# Patient Record
Sex: FEMALE | Race: BLACK OR AFRICAN AMERICAN | NOT HISPANIC OR LATINO | Employment: OTHER | URBAN - METROPOLITAN AREA
[De-identification: names, ages, dates, MRNs, and addresses within clinical notes are randomized per-mention and may not be internally consistent; named-entity substitution may affect disease eponyms.]

---

## 2015-10-29 LAB — BCS RECOMMENDATION EXT: NORMAL

## 2019-11-18 LAB — PAP RECOMMENDATION EXT: ABNORMAL

## 2019-11-21 LAB
HPV 16: NEGATIVE
HPV 18: NEGATIVE
HPV, HR, OTHER GENOTYPES: NEGATIVE

## 2019-12-05 PROBLEM — N02.2 MEMBRANOUS NEPHROPATHY DETERMINED BY BIOPSY: Status: ACTIVE | Noted: 2019-11-23

## 2019-12-05 PROBLEM — M79.89 LEG SWELLING: Status: ACTIVE | Noted: 2019-12-05

## 2019-12-05 PROBLEM — N17.9 AKI (ACUTE KIDNEY INJURY): Status: ACTIVE | Noted: 2019-05-18

## 2019-12-05 PROBLEM — R09.89 BILATERAL RALES: Status: ACTIVE | Noted: 2019-12-05

## 2019-12-05 PROBLEM — R06.02 SOB (SHORTNESS OF BREATH): Status: ACTIVE | Noted: 2019-12-05

## 2019-12-05 PROBLEM — I10 HYPERTENSION: Status: ACTIVE | Noted: 2019-12-05

## 2019-12-05 PROBLEM — N04.9 NEPHROTIC SYNDROME: Status: ACTIVE | Noted: 2019-12-05

## 2019-12-08 PROBLEM — R06.02 SHORTNESS OF BREATH: Status: RESOLVED | Noted: 2019-12-05 | Resolved: 2019-12-08

## 2019-12-08 PROBLEM — R09.89 BILATERAL RALES: Status: RESOLVED | Noted: 2019-12-05 | Resolved: 2019-12-08

## 2019-12-08 PROBLEM — N04.9 RENAL ANASARCA: Status: ACTIVE | Noted: 2019-12-08

## 2019-12-08 PROBLEM — M79.89 LEG SWELLING: Status: RESOLVED | Noted: 2019-12-05 | Resolved: 2019-12-08

## 2019-12-12 PROBLEM — N17.9 AKI (ACUTE KIDNEY INJURY): Status: RESOLVED | Noted: 2019-05-18 | Resolved: 2019-12-12

## 2019-12-18 PROBLEM — I48.91 ATRIAL FIBRILLATION: Status: ACTIVE | Noted: 2019-11-22

## 2019-12-18 PROBLEM — E78.5 HYPERLIPIDEMIA: Status: ACTIVE | Noted: 2019-11-13

## 2019-12-18 PROBLEM — R06.00 DYSPNEA: Status: ACTIVE | Noted: 2019-11-13

## 2019-12-18 PROBLEM — N26.9 GLOMERULOSCLEROSIS: Status: ACTIVE | Noted: 2019-12-18

## 2019-12-18 PROBLEM — N93.9 VAGINAL BLEEDING: Status: ACTIVE | Noted: 2019-12-18

## 2019-12-18 PROBLEM — N95.0 POSTMENOPAUSAL BLEEDING: Status: ACTIVE | Noted: 2019-12-18

## 2019-12-18 PROBLEM — R06.02 SOB (SHORTNESS OF BREATH): Status: ACTIVE | Noted: 2019-11-13

## 2019-12-21 PROBLEM — G62.9 PERIPHERAL POLYNEUROPATHY: Status: ACTIVE | Noted: 2019-12-21

## 2019-12-23 PROBLEM — R06.02 SHORTNESS OF BREATH: Status: RESOLVED | Noted: 2019-11-13 | Resolved: 2019-12-23

## 2019-12-23 PROBLEM — R60.0 PERIPHERAL EDEMA: Status: ACTIVE | Noted: 2019-12-23

## 2019-12-24 PROBLEM — R11.10 VOMITING: Status: ACTIVE | Noted: 2019-12-24

## 2020-01-07 PROBLEM — E87.3 METABOLIC ALKALOSIS: Status: ACTIVE | Noted: 2020-01-07

## 2020-01-07 PROBLEM — N18.4 CKD (CHRONIC KIDNEY DISEASE) STAGE 4, GFR 15-29 ML/MIN: Status: ACTIVE | Noted: 2020-01-07

## 2020-01-07 PROBLEM — R26.2 DIFFICULTY WALKING: Status: ACTIVE | Noted: 2020-01-07

## 2020-01-07 PROBLEM — R68.89 DECREASED FUNCTIONAL ACTIVITY TOLERANCE: Status: ACTIVE | Noted: 2020-01-07

## 2020-01-07 PROBLEM — E66.01 OBESITY, CLASS III, BMI 40-49.9 (MORBID OBESITY): Status: ACTIVE | Noted: 2020-01-07

## 2020-01-07 PROBLEM — N25.81 SECONDARY HYPERPARATHYROIDISM: Status: ACTIVE | Noted: 2020-01-07

## 2020-01-07 PROBLEM — E83.51 HYPOCALCEMIA: Status: ACTIVE | Noted: 2020-01-07

## 2020-01-07 PROBLEM — E55.9 VITAMIN D INSUFFICIENCY: Status: ACTIVE | Noted: 2020-01-07

## 2020-02-27 PROBLEM — R11.10 VOMITING: Status: RESOLVED | Noted: 2019-12-24 | Resolved: 2020-02-27

## 2020-02-27 PROBLEM — R26.2 DIFFICULTY WALKING: Status: RESOLVED | Noted: 2020-01-07 | Resolved: 2020-02-27

## 2020-02-27 PROBLEM — R60.0 PERIPHERAL EDEMA: Status: RESOLVED | Noted: 2019-12-23 | Resolved: 2020-02-27

## 2020-04-09 PROBLEM — E83.39 HYPERPHOSPHATEMIA: Chronic | Status: ACTIVE | Noted: 2020-04-09

## 2020-04-09 PROBLEM — D50.8 IRON DEFICIENCY ANEMIA SECONDARY TO INADEQUATE DIETARY IRON INTAKE: Status: ACTIVE | Noted: 2020-04-09

## 2020-04-09 PROBLEM — E66.01 OBESITY, CLASS III, BMI 40-49.9 (MORBID OBESITY): Status: RESOLVED | Noted: 2020-01-07 | Resolved: 2020-04-09

## 2020-05-19 PROBLEM — Z12.11 SCREENING FOR COLORECTAL CANCER: Status: ACTIVE | Noted: 2020-05-19

## 2020-05-19 PROBLEM — Z12.12 SCREENING FOR COLORECTAL CANCER: Status: ACTIVE | Noted: 2020-05-19

## 2020-05-29 PROBLEM — D50.9 IRON DEFICIENCY ANEMIA: Status: ACTIVE | Noted: 2020-05-29

## 2020-05-29 PROBLEM — R79.89 AZOTEMIA: Status: ACTIVE | Noted: 2020-05-29

## 2020-05-29 LAB — CRC RECOMMENDATION EXT: NORMAL

## 2020-05-30 PROBLEM — E83.51 HYPOCALCEMIA: Status: RESOLVED | Noted: 2020-01-07 | Resolved: 2020-05-30

## 2020-05-30 PROBLEM — M79.604 BILATERAL LEG PAIN: Status: ACTIVE | Noted: 2020-05-30

## 2020-05-30 PROBLEM — M79.605 BILATERAL LEG PAIN: Status: ACTIVE | Noted: 2020-05-30

## 2020-06-02 PROBLEM — N19 UREMIA: Status: ACTIVE | Noted: 2020-06-02

## 2020-06-02 PROBLEM — N18.5 CKD (CHRONIC KIDNEY DISEASE) STAGE 5, GFR LESS THAN 15 ML/MIN: Status: ACTIVE | Noted: 2020-01-07

## 2020-06-17 ENCOUNTER — NURSE TRIAGE (OUTPATIENT)
Dept: ADMINISTRATIVE | Facility: CLINIC | Age: 55
End: 2020-06-17

## 2020-06-17 NOTE — TELEPHONE ENCOUNTER
Caller unable to hear RN on home number. Mobile number busy upon second attempt. Unsuccessful attempts to contact pt at this time. An [facility] ([facility]) RN will reach out again tomorrow.

## 2020-06-18 NOTE — TELEPHONE ENCOUNTER
Pt outreach occurred x 2 unsuccessful attempts on opposing days; all numbers on pts chart attempted. Chart will be closed per [facility] ([facility]) policy.

## 2020-06-26 PROBLEM — E87.3 METABOLIC ALKALOSIS: Status: RESOLVED | Noted: 2020-01-07 | Resolved: 2020-06-26

## 2020-06-26 PROBLEM — M79.605 BILATERAL LEG PAIN: Status: RESOLVED | Noted: 2020-05-30 | Resolved: 2020-06-26

## 2020-06-26 PROBLEM — M79.604 BILATERAL LEG PAIN: Status: RESOLVED | Noted: 2020-05-30 | Resolved: 2020-06-26

## 2020-06-26 PROBLEM — N19 UREMIA: Status: RESOLVED | Noted: 2020-06-02 | Resolved: 2020-06-26

## 2020-06-26 PROBLEM — N17.9 AKI (ACUTE KIDNEY INJURY): Status: RESOLVED | Noted: 2019-05-18 | Resolved: 2020-06-26

## 2020-06-26 PROBLEM — N93.9 VAGINAL BLEEDING: Status: RESOLVED | Noted: 2019-12-18 | Resolved: 2020-06-26

## 2020-10-15 PROBLEM — Z99.2 ESRD (END STAGE RENAL DISEASE) ON DIALYSIS: Status: ACTIVE | Noted: 2020-10-15

## 2020-10-15 PROBLEM — N18.6 ESRD (END STAGE RENAL DISEASE) ON DIALYSIS: Status: ACTIVE | Noted: 2020-10-15

## 2020-12-21 PROBLEM — Z49.01 FITTING AND ADJUSTMENT OF EXTRACORPOREAL DIALYSIS CATHETER: Status: ACTIVE | Noted: 2020-05-19

## 2021-01-05 DIAGNOSIS — U07.1 COVID-19 VIRUS DETECTED: ICD-10-CM

## 2021-08-10 ENCOUNTER — CLINICAL SUPPORT (OUTPATIENT)
Dept: SMOKING CESSATION | Facility: CLINIC | Age: 56
End: 2021-08-10
Payer: COMMERCIAL

## 2021-08-10 DIAGNOSIS — F17.200 NICOTINE DEPENDENCE: Primary | ICD-10-CM

## 2021-08-10 PROCEDURE — 99404 PR PREVENT COUNSEL,INDIV,60 MIN: ICD-10-PCS | Mod: S$GLB,,, | Performed by: GENERAL PRACTICE

## 2021-08-10 PROCEDURE — 99404 PREV MED CNSL INDIV APPRX 60: CPT | Mod: S$GLB,,, | Performed by: GENERAL PRACTICE

## 2021-08-10 RX ORDER — MICONAZOLE NITRATE 2 %
2 CREAM (GRAM) TOPICAL
Qty: 100 EACH | Refills: 0 | Status: ON HOLD | OUTPATIENT
Start: 2021-08-10 | End: 2021-11-24 | Stop reason: CLARIF

## 2021-08-10 RX ORDER — IBUPROFEN 200 MG
1 TABLET ORAL DAILY
Qty: 14 PATCH | Refills: 0 | Status: ON HOLD | OUTPATIENT
Start: 2021-08-10 | End: 2021-11-24 | Stop reason: CLARIF

## 2021-08-25 ENCOUNTER — CLINICAL SUPPORT (OUTPATIENT)
Dept: SMOKING CESSATION | Facility: CLINIC | Age: 56
End: 2021-08-25
Payer: COMMERCIAL

## 2021-08-25 DIAGNOSIS — F17.200 NICOTINE DEPENDENCE: Primary | ICD-10-CM

## 2021-08-25 PROCEDURE — 99402 PR PREVENT COUNSEL,INDIV,30 MIN: ICD-10-PCS | Mod: S$GLB,,, | Performed by: GENERAL PRACTICE

## 2021-08-25 PROCEDURE — 99402 PREV MED CNSL INDIV APPRX 30: CPT | Mod: S$GLB,,, | Performed by: GENERAL PRACTICE

## 2021-09-14 ENCOUNTER — TELEPHONE (OUTPATIENT)
Dept: SMOKING CESSATION | Facility: CLINIC | Age: 56
End: 2021-09-14

## 2021-09-16 ENCOUNTER — TELEPHONE (OUTPATIENT)
Dept: SMOKING CESSATION | Facility: CLINIC | Age: 56
End: 2021-09-16

## 2021-10-05 ENCOUNTER — TELEPHONE (OUTPATIENT)
Dept: SMOKING CESSATION | Facility: CLINIC | Age: 56
End: 2021-10-05

## 2021-10-06 ENCOUNTER — CLINICAL SUPPORT (OUTPATIENT)
Dept: SMOKING CESSATION | Facility: CLINIC | Age: 56
End: 2021-10-06
Payer: COMMERCIAL

## 2021-10-06 DIAGNOSIS — F17.200 NICOTINE DEPENDENCE: Primary | ICD-10-CM

## 2021-10-06 PROCEDURE — 99402 PREV MED CNSL INDIV APPRX 30: CPT | Mod: S$GLB,,, | Performed by: GENERAL PRACTICE

## 2021-10-06 PROCEDURE — 99402 PR PREVENT COUNSEL,INDIV,30 MIN: ICD-10-PCS | Mod: S$GLB,,, | Performed by: GENERAL PRACTICE

## 2021-10-06 RX ORDER — MICONAZOLE NITRATE 2 %
2 CREAM (GRAM) TOPICAL
Qty: 100 EACH | Refills: 0 | Status: ON HOLD | OUTPATIENT
Start: 2021-10-06 | End: 2021-11-24 | Stop reason: CLARIF

## 2021-10-27 ENCOUNTER — CLINICAL SUPPORT (OUTPATIENT)
Dept: SMOKING CESSATION | Facility: CLINIC | Age: 56
End: 2021-10-27
Payer: COMMERCIAL

## 2021-10-27 ENCOUNTER — TELEPHONE (OUTPATIENT)
Dept: SMOKING CESSATION | Facility: CLINIC | Age: 56
End: 2021-10-27
Payer: MEDICARE

## 2021-10-27 DIAGNOSIS — F17.200 NICOTINE DEPENDENCE: Primary | ICD-10-CM

## 2021-10-27 PROCEDURE — 99402 PR PREVENT COUNSEL,INDIV,30 MIN: ICD-10-PCS | Mod: S$GLB,,, | Performed by: GENERAL PRACTICE

## 2021-10-27 PROCEDURE — 99402 PREV MED CNSL INDIV APPRX 30: CPT | Mod: S$GLB,,, | Performed by: GENERAL PRACTICE

## 2021-11-26 PROBLEM — Z78.9 PROBLEM WITH VASCULAR ACCESS: Status: ACTIVE | Noted: 2021-11-26

## 2021-11-26 PROBLEM — E87.5 HYPERKALEMIA: Status: ACTIVE | Noted: 2021-11-26

## 2021-11-27 PROBLEM — E87.5 HYPERKALEMIA: Status: RESOLVED | Noted: 2021-11-26 | Resolved: 2021-11-27

## 2021-11-27 PROBLEM — I12.9 HYPERTENSIVE NEPHROPATHY: Status: ACTIVE | Noted: 2019-11-23

## 2021-11-28 PROBLEM — E83.39 HYPERPHOSPHATEMIA: Status: ACTIVE | Noted: 2020-04-09

## 2021-12-07 PROBLEM — I82.409 DVT (DEEP VENOUS THROMBOSIS): Status: ACTIVE | Noted: 2021-12-07

## 2022-02-15 PROBLEM — E87.1 HYPONATREMIA: Status: ACTIVE | Noted: 2022-02-15

## 2022-02-15 PROBLEM — T81.49XA SURGICAL WOUND INFECTION: Status: ACTIVE | Noted: 2022-02-15

## 2022-02-16 PROBLEM — M79.602 PAIN IN ANTERIOR LEFT UPPER EXTREMITY: Status: ACTIVE | Noted: 2022-02-16

## 2022-02-18 PROBLEM — B96.89 BACTERIAL VAGINOSIS: Status: ACTIVE | Noted: 2022-02-18

## 2022-02-18 PROBLEM — N76.0 BACTERIAL VAGINOSIS: Status: ACTIVE | Noted: 2022-02-18

## 2022-02-21 PROBLEM — N93.9 VAGINAL BLEEDING: Status: RESOLVED | Noted: 2019-12-18 | Resolved: 2022-02-21

## 2022-02-21 PROBLEM — E87.5 HYPERKALEMIA: Status: RESOLVED | Noted: 2021-11-26 | Resolved: 2022-02-21

## 2022-03-01 PROBLEM — G63 NEUROPATHY DUE TO MEDICAL CONDITION: Status: ACTIVE | Noted: 2022-03-01

## 2022-03-22 ENCOUNTER — DOCUMENT SCAN (OUTPATIENT)
Dept: HOME HEALTH SERVICES | Facility: HOSPITAL | Age: 57
End: 2022-03-22
Payer: COMMERCIAL

## 2022-03-24 PROBLEM — E83.51 HYPOCALCEMIA: Status: RESOLVED | Noted: 2020-01-07 | Resolved: 2022-03-24

## 2022-03-24 PROBLEM — B96.89 BACTERIAL VAGINOSIS: Status: RESOLVED | Noted: 2022-02-18 | Resolved: 2022-03-24

## 2022-03-24 PROBLEM — I82.90 ACUTE DEEP VEIN THROMBOSIS (DVT) OF NON-EXTREMITY VEIN: Status: ACTIVE | Noted: 2021-12-07

## 2022-03-24 PROBLEM — N76.0 BACTERIAL VAGINOSIS: Status: RESOLVED | Noted: 2022-02-18 | Resolved: 2022-03-24

## 2022-03-29 ENCOUNTER — EXTERNAL HOME HEALTH (OUTPATIENT)
Dept: HOME HEALTH SERVICES | Facility: HOSPITAL | Age: 57
End: 2022-03-29
Payer: COMMERCIAL

## 2022-04-14 ENCOUNTER — DOCUMENT SCAN (OUTPATIENT)
Dept: HOME HEALTH SERVICES | Facility: HOSPITAL | Age: 57
End: 2022-04-14
Payer: COMMERCIAL

## 2022-04-18 ENCOUNTER — DOCUMENT SCAN (OUTPATIENT)
Dept: HOME HEALTH SERVICES | Facility: HOSPITAL | Age: 57
End: 2022-04-18
Payer: COMMERCIAL

## 2022-06-23 PROBLEM — G47.9 SLEEPING DIFFICULTIES: Status: ACTIVE | Noted: 2022-06-23

## 2022-06-23 PROBLEM — Z72.0 TOBACCO USE: Status: ACTIVE | Noted: 2022-06-23

## 2022-06-23 PROBLEM — H53.8 BLURRY VISION: Status: ACTIVE | Noted: 2022-06-23

## 2022-08-04 ENCOUNTER — CLINICAL SUPPORT (OUTPATIENT)
Dept: SMOKING CESSATION | Facility: CLINIC | Age: 57
End: 2022-08-04
Payer: COMMERCIAL

## 2022-08-04 DIAGNOSIS — F17.200 NICOTINE DEPENDENCE: Primary | ICD-10-CM

## 2022-08-04 PROCEDURE — 99407 PR TOBACCO USE CESSATION INTENSIVE >10 MINUTES: ICD-10-PCS | Mod: S$GLB,,,

## 2022-08-04 PROCEDURE — 99407 BEHAV CHNG SMOKING > 10 MIN: CPT | Mod: S$GLB,,,

## 2022-08-04 NOTE — PROGRESS NOTES
Spoke with patient today in regard to smoking cessation progress for 12 month phone follow up on quit 1. Patient not tobacco free at this time. Patient has scheduled an appointment to return to the program for Quit attempt #2. Informed patient of benefit period, future follow ups, and contact information if any further help or support is needed.  Will complete smart form and resolve Quit attempt #1.

## 2022-08-08 ENCOUNTER — TELEPHONE (OUTPATIENT)
Dept: SMOKING CESSATION | Facility: CLINIC | Age: 57
End: 2022-08-08
Payer: COMMERCIAL

## 2022-08-09 ENCOUNTER — TELEPHONE (OUTPATIENT)
Dept: SMOKING CESSATION | Facility: CLINIC | Age: 57
End: 2022-08-09
Payer: COMMERCIAL

## 2022-09-01 PROBLEM — I95.9 HYPOTENSION: Status: ACTIVE | Noted: 2022-09-01

## 2022-09-01 PROBLEM — D64.9 ANEMIA: Status: ACTIVE | Noted: 2022-09-01

## 2022-09-01 PROBLEM — L29.9 ITCHING: Status: ACTIVE | Noted: 2022-09-01

## 2022-10-29 ENCOUNTER — PATIENT OUTREACH (OUTPATIENT)
Dept: ADMINISTRATIVE | Facility: HOSPITAL | Age: 57
End: 2022-10-29
Payer: COMMERCIAL

## 2022-12-15 PROBLEM — Z78.9 PROBLEM WITH VASCULAR ACCESS: Status: RESOLVED | Noted: 2021-11-26 | Resolved: 2022-12-15

## 2022-12-15 PROBLEM — Z86.718: Status: ACTIVE | Noted: 2022-12-15

## 2022-12-15 PROBLEM — M79.602 PAIN IN ANTERIOR LEFT UPPER EXTREMITY: Status: RESOLVED | Noted: 2022-02-16 | Resolved: 2022-12-15

## 2022-12-15 PROBLEM — D50.9 IRON DEFICIENCY ANEMIA: Status: RESOLVED | Noted: 2020-05-29 | Resolved: 2022-12-15

## 2022-12-15 PROBLEM — E83.39 HYPERPHOSPHATEMIA: Status: RESOLVED | Noted: 2020-04-09 | Resolved: 2022-12-15

## 2022-12-15 PROBLEM — G47.9 SLEEPING DIFFICULTIES: Status: RESOLVED | Noted: 2022-06-23 | Resolved: 2022-12-15

## 2022-12-15 PROBLEM — D64.9 ANEMIA: Status: RESOLVED | Noted: 2022-09-01 | Resolved: 2022-12-15

## 2022-12-15 PROBLEM — T81.49XA SURGICAL WOUND INFECTION: Status: RESOLVED | Noted: 2022-02-15 | Resolved: 2022-12-15

## 2022-12-15 PROBLEM — D50.8 IRON DEFICIENCY ANEMIA SECONDARY TO INADEQUATE DIETARY IRON INTAKE: Status: RESOLVED | Noted: 2020-04-09 | Resolved: 2022-12-15

## 2022-12-15 PROBLEM — E87.1 HYPONATREMIA: Status: RESOLVED | Noted: 2022-02-15 | Resolved: 2022-12-15

## 2022-12-15 PROBLEM — I82.90 ACUTE DEEP VEIN THROMBOSIS (DVT) OF NON-EXTREMITY VEIN: Status: RESOLVED | Noted: 2021-12-07 | Resolved: 2022-12-15

## 2022-12-15 PROBLEM — I95.9 HYPOTENSION: Status: RESOLVED | Noted: 2022-09-01 | Resolved: 2022-12-15

## 2022-12-15 PROBLEM — I82.C19: Status: ACTIVE | Noted: 2022-12-15

## 2023-02-10 ENCOUNTER — PES CALL (OUTPATIENT)
Dept: ADMINISTRATIVE | Facility: CLINIC | Age: 58
End: 2023-02-10

## 2023-02-23 PROBLEM — N95.0 POSTMENOPAUSAL BLEEDING: Status: RESOLVED | Noted: 2019-12-18 | Resolved: 2023-02-23

## 2023-02-23 PROBLEM — I82.C19: Status: ACTIVE | Noted: 2023-02-23

## 2023-02-23 PROBLEM — L29.9 ITCHING: Status: RESOLVED | Noted: 2022-09-01 | Resolved: 2023-02-23

## 2023-02-23 PROBLEM — I82.C19: Status: RESOLVED | Noted: 2023-02-23 | Resolved: 2023-02-23

## 2023-02-23 PROBLEM — Z00.00 HEALTHCARE MAINTENANCE: Status: RESOLVED | Noted: 2020-05-19 | Resolved: 2023-02-23

## 2023-02-23 PROBLEM — R79.9 ABNORMAL FINDING OF BLOOD CHEMISTRY, UNSPECIFIED: Status: RESOLVED | Noted: 2023-02-23 | Resolved: 2023-02-23

## 2023-02-23 PROBLEM — R79.9 ABNORMAL FINDING OF BLOOD CHEMISTRY, UNSPECIFIED: Status: ACTIVE | Noted: 2023-02-23

## 2023-02-23 PROBLEM — Z00.00 HEALTHCARE MAINTENANCE: Status: ACTIVE | Noted: 2020-05-19

## 2023-07-25 PROBLEM — K02.9 CARIES: Status: ACTIVE | Noted: 2023-07-25

## 2023-08-08 ENCOUNTER — PATIENT OUTREACH (OUTPATIENT)
Dept: ADMINISTRATIVE | Facility: HOSPITAL | Age: 58
End: 2023-08-08

## 2023-08-17 PROBLEM — Z12.39 ENCOUNTER FOR SCREENING FOR MALIGNANT NEOPLASM OF BREAST: Status: ACTIVE | Noted: 2020-05-19

## 2023-08-17 PROBLEM — Z12.31 ENCOUNTER FOR SCREENING MAMMOGRAM FOR MALIGNANT NEOPLASM OF BREAST: Status: ACTIVE | Noted: 2020-05-19

## 2023-08-17 PROBLEM — G62.9 PERIPHERAL NEUROPATHY: Status: ACTIVE | Noted: 2022-03-01

## 2024-01-11 DIAGNOSIS — Z00.00 ENCOUNTER FOR MEDICARE ANNUAL WELLNESS EXAM: ICD-10-CM

## 2024-08-06 PROBLEM — K08.9 POOR DENTITION: Status: ACTIVE | Noted: 2024-08-06

## 2024-09-11 ENCOUNTER — PATIENT OUTREACH (OUTPATIENT)
Dept: ADMINISTRATIVE | Facility: HOSPITAL | Age: 59
End: 2024-09-11

## 2024-09-13 PROBLEM — Z00.00 ROUTINE ADULT HEALTH MAINTENANCE: Status: ACTIVE | Noted: 2024-09-13

## 2024-09-13 PROBLEM — R22.31 MASS OF AXILLA, RIGHT: Status: ACTIVE | Noted: 2024-09-13

## 2024-09-13 PROBLEM — H61.20 IMPACTED CERUMEN: Status: ACTIVE | Noted: 2024-09-13

## 2024-10-28 ENCOUNTER — PATIENT OUTREACH (OUTPATIENT)
Dept: ADMINISTRATIVE | Facility: HOSPITAL | Age: 59
End: 2024-10-28

## 2024-10-29 PROBLEM — E78.5 HYPERLIPIDEMIA: Status: RESOLVED | Noted: 2019-11-13 | Resolved: 2024-10-29

## 2024-10-29 PROBLEM — H53.8 BLURRY VISION: Status: RESOLVED | Noted: 2022-06-23 | Resolved: 2024-10-29

## 2024-10-29 PROBLEM — N25.81 SECONDARY HYPERPARATHYROIDISM: Status: RESOLVED | Noted: 2020-01-07 | Resolved: 2024-10-29

## 2024-10-29 PROBLEM — Z00.00 ROUTINE ADULT HEALTH MAINTENANCE: Status: RESOLVED | Noted: 2024-09-13 | Resolved: 2024-10-29

## 2024-10-29 PROBLEM — I12.9 HYPERTENSIVE NEPHROPATHY: Status: RESOLVED | Noted: 2019-11-23 | Resolved: 2024-10-29

## 2024-10-29 PROBLEM — E55.9 VITAMIN D INSUFFICIENCY: Status: RESOLVED | Noted: 2020-01-07 | Resolved: 2024-10-29

## 2024-10-29 PROBLEM — N18.6 ESRD (END STAGE RENAL DISEASE): Status: ACTIVE | Noted: 2020-10-15

## 2024-10-29 PROBLEM — N26.9 GLOMERULOSCLEROSIS: Status: RESOLVED | Noted: 2019-12-18 | Resolved: 2024-10-29

## 2024-10-29 PROBLEM — Z86.718: Status: RESOLVED | Noted: 2022-12-15 | Resolved: 2024-10-29

## 2024-11-19 PROBLEM — K03.6 DENTAL PLAQUE: Status: ACTIVE | Noted: 2024-11-19

## 2025-02-01 PROBLEM — R63.4 WEIGHT LOSS, UNINTENTIONAL: Status: ACTIVE | Noted: 2025-02-01

## 2025-03-16 PROBLEM — H02.886 MEIBOMIAN GLAND DYSFUNCTION (MGD) OF BOTH EYES: Status: ACTIVE | Noted: 2025-03-16

## 2025-03-16 PROBLEM — H52.7 REFRACTIVE ERROR: Status: ACTIVE | Noted: 2025-03-16

## 2025-03-16 PROBLEM — H04.123 DRY EYE SYNDROME OF BOTH EYES: Status: ACTIVE | Noted: 2025-03-16

## 2025-03-16 PROBLEM — H02.883 MEIBOMIAN GLAND DYSFUNCTION (MGD) OF BOTH EYES: Status: ACTIVE | Noted: 2025-03-16

## 2025-03-16 PROBLEM — H25.813 COMBINED FORM OF AGE-RELATED CATARACT, BOTH EYES: Status: ACTIVE | Noted: 2025-03-16

## 2025-03-27 PROBLEM — Z71.6 TOBACCO ABUSE COUNSELING: Status: ACTIVE | Noted: 2025-03-27

## 2025-03-27 PROBLEM — Z87.891 PERSONAL HISTORY OF TOBACCO USE, PRESENTING HAZARDS TO HEALTH: Status: ACTIVE | Noted: 2025-03-27

## 2025-04-07 ENCOUNTER — PATIENT OUTREACH (OUTPATIENT)
Dept: ADMINISTRATIVE | Facility: HOSPITAL | Age: 60
End: 2025-04-07